# Patient Record
Sex: FEMALE | Race: WHITE | Employment: FULL TIME | URBAN - METROPOLITAN AREA
[De-identification: names, ages, dates, MRNs, and addresses within clinical notes are randomized per-mention and may not be internally consistent; named-entity substitution may affect disease eponyms.]

---

## 2022-07-01 RX ORDER — RIZATRIPTAN BENZOATE 10 MG/1
TABLET ORAL
COMMUNITY

## 2022-07-01 RX ORDER — GABAPENTIN 300 MG/1
1 CAPSULE ORAL
COMMUNITY
Start: 2022-03-01 | End: 2022-08-09

## 2022-07-01 RX ORDER — ASPIRIN 325 MG
TABLET ORAL
COMMUNITY

## 2022-09-26 PROBLEM — G43.009 MIGRAINE WITHOUT AURA AND WITHOUT STATUS MIGRAINOSUS, NOT INTRACTABLE: Status: ACTIVE | Noted: 2022-09-26

## 2022-09-26 PROBLEM — E55.9 VITAMIN D DEFICIENCY: Status: ACTIVE | Noted: 2022-09-26

## 2022-09-26 PROBLEM — G47.00 INSOMNIA: Status: ACTIVE | Noted: 2022-09-26

## 2022-09-26 PROBLEM — F41.8 DEPRESSION WITH ANXIETY: Status: ACTIVE | Noted: 2022-09-26

## 2022-09-26 PROBLEM — G89.29 OTHER CHRONIC PAIN: Status: ACTIVE | Noted: 2022-09-26

## 2022-09-26 PROBLEM — R51.9 FREQUENT HEADACHES: Status: ACTIVE | Noted: 2022-09-26

## 2022-09-26 PROBLEM — I67.1 CEREBRAL ANEURYSM, NONRUPTURED: Status: ACTIVE | Noted: 2022-09-26

## 2022-09-26 PROBLEM — R41.3 MEMORY PROBLEM: Status: ACTIVE | Noted: 2022-09-26

## 2022-09-26 PROBLEM — M54.2 NECK PAIN: Status: ACTIVE | Noted: 2022-09-26

## 2023-03-27 ENCOUNTER — OFFICE VISIT (OUTPATIENT)
Dept: ENT CLINIC | Age: 38
End: 2023-03-27
Payer: COMMERCIAL

## 2023-03-27 VITALS
DIASTOLIC BLOOD PRESSURE: 70 MMHG | HEIGHT: 68 IN | BODY MASS INDEX: 20.95 KG/M2 | WEIGHT: 138.2 LBS | SYSTOLIC BLOOD PRESSURE: 108 MMHG

## 2023-03-27 DIAGNOSIS — M26.609 TMJ (TEMPOROMANDIBULAR JOINT DISORDER): Primary | ICD-10-CM

## 2023-03-27 PROCEDURE — 99244 OFF/OP CNSLTJ NEW/EST MOD 40: CPT | Performed by: STUDENT IN AN ORGANIZED HEALTH CARE EDUCATION/TRAINING PROGRAM

## 2023-03-27 RX ORDER — LAMOTRIGINE 25 MG/1
25 TABLET ORAL DAILY
COMMUNITY
Start: 2023-03-06

## 2023-03-27 RX ORDER — ESZOPICLONE 1 MG/1
1 TABLET, FILM COATED ORAL NIGHTLY
COMMUNITY

## 2023-03-27 RX ORDER — DOCUSATE SODIUM 100 MG/1
CAPSULE, LIQUID FILLED ORAL
COMMUNITY
Start: 2020-11-13

## 2023-03-27 RX ORDER — DOXYCYCLINE HYCLATE 100 MG/1
CAPSULE ORAL
COMMUNITY
Start: 2023-03-26

## 2023-03-27 RX ORDER — ALBUTEROL SULFATE 90 UG/1
AEROSOL, METERED RESPIRATORY (INHALATION)
COMMUNITY
Start: 2023-01-30

## 2023-03-27 RX ORDER — ATOGEPANT 30 MG/1
30 TABLET ORAL DAILY
COMMUNITY
Start: 2023-02-22 | End: 2024-02-22

## 2023-03-27 RX ORDER — SODIUM FLUORIDE1.1%, POTASSIUM NITRATE 5% 5.8; 57.5 MG/ML; MG/ML
GEL, DENTIFRICE DENTAL
COMMUNITY
Start: 2023-03-25

## 2023-03-27 RX ORDER — PREDNISONE 20 MG/1
TABLET ORAL
COMMUNITY
Start: 2023-03-26

## 2023-03-27 RX ORDER — ARIPIPRAZOLE 10 MG/1
10 TABLET ORAL DAILY
COMMUNITY

## 2023-03-27 ASSESSMENT — ENCOUNTER SYMPTOMS
TROUBLE SWALLOWING: 1
EYE REDNESS: 0
VOMITING: 0
SHORTNESS OF BREATH: 0
EYE ITCHING: 0

## 2023-03-27 NOTE — PROGRESS NOTES
CBC  [facility]  03/04/2023  Component      WBC   RBC   Hemoglobin   Hematocrit   MCV   MCH   MCHC   RDW-CV   Platelets   MPV   RDW   nRBC Percent   nRBC Abs     Component 03/04/2023 03/03/2023 10/17/2022 09/06/2022          WBC 9.8 14.5 High     5.2 13.6 High       RBC 4.39 5.21 4.80 4.54   Hemoglobin 12.9 14.8 14.0 13.3   Hematocrit 39.7 46.3 42.5 40.2   MCV 90.4 88.9 89 88.5   MCH 29.4 28.4 29.2 29.3   MCHC 32.5 32.0 32.9 33.1   RDW-CV 13.9 13.9 -- 12.7   Platelets [phone] 291   MPV 12.0        ASSESSMENT and PLAN  38-year-old female with left-sided neck and jaw pain secondary to TMJ. I will refer her to Dr. Radha Castellanos. She can follow back up as needed. ICD-10-CM    1. TMJ (temporomandibular joint disorder)  M26.609 External Referral To Oral Maxillofacial Surgery            Michael Martinez MD  3/27/2023  Electronically signed    Note dictated using voice recognition software. Please excuse any typos.